# Patient Record
Sex: MALE | Race: WHITE | NOT HISPANIC OR LATINO | URBAN - METROPOLITAN AREA
[De-identification: names, ages, dates, MRNs, and addresses within clinical notes are randomized per-mention and may not be internally consistent; named-entity substitution may affect disease eponyms.]

---

## 2018-11-05 ENCOUNTER — OUTPATIENT (OUTPATIENT)
Dept: OUTPATIENT SERVICES | Facility: HOSPITAL | Age: 83
LOS: 1 days | Discharge: HOME | End: 2018-11-05

## 2018-11-05 DIAGNOSIS — I10 ESSENTIAL (PRIMARY) HYPERTENSION: ICD-10-CM

## 2018-11-05 DIAGNOSIS — R94.31 ABNORMAL ELECTROCARDIOGRAM [ECG] [EKG]: ICD-10-CM

## 2018-11-07 ENCOUNTER — OUTPATIENT (OUTPATIENT)
Dept: OUTPATIENT SERVICES | Facility: HOSPITAL | Age: 83
LOS: 1 days | Discharge: HOME | End: 2018-11-07

## 2018-11-07 DIAGNOSIS — R94.31 ABNORMAL ELECTROCARDIOGRAM [ECG] [EKG]: ICD-10-CM

## 2023-06-20 PROBLEM — Z00.00 ENCOUNTER FOR PREVENTIVE HEALTH EXAMINATION: Status: ACTIVE | Noted: 2023-06-20

## 2023-06-21 ENCOUNTER — APPOINTMENT (OUTPATIENT)
Dept: NEUROLOGY | Facility: CLINIC | Age: 88
End: 2023-06-21
Payer: MEDICARE

## 2023-06-21 DIAGNOSIS — Z85.828 PERSONAL HISTORY OF OTHER MALIGNANT NEOPLASM OF SKIN: ICD-10-CM

## 2023-06-21 DIAGNOSIS — Z86.79 PERSONAL HISTORY OF OTHER DISEASES OF THE CIRCULATORY SYSTEM: ICD-10-CM

## 2023-06-21 DIAGNOSIS — Z86.39 PERSONAL HISTORY OF OTHER ENDOCRINE, NUTRITIONAL AND METABOLIC DISEASE: ICD-10-CM

## 2023-06-21 PROCEDURE — 99204 OFFICE O/P NEW MOD 45 MIN: CPT

## 2023-06-21 RX ORDER — AMLODIPINE BESYLATE 5 MG/1
5 TABLET ORAL
Refills: 0 | Status: ACTIVE | COMMUNITY

## 2023-06-21 RX ORDER — PRAVASTATIN SODIUM 10 MG/1
10 TABLET ORAL
Refills: 0 | Status: ACTIVE | COMMUNITY

## 2023-06-21 NOTE — PHYSICAL EXAM
[FreeTextEntry1] : PHYSICAL EXAMINATION:\par Head: Normocephalic, atraumatic. Negative TA tenderness/prominence. \par \par Neck: Supple with full range of motion; nontender with negative bilateral Spurling's signs. \par \par Spine: Full range of motion; nontender. Negative straight leg raise maneuvers. \par \par Extremities: Non-tender. Atraumatic. Negative Tinel's signs. \par \par NEUROLOGICAL EXAMINATION: \par \par Mental Status: Patient is a good informant with intact orientation, attention, concentration, recent and remote memory. Language evaluation reveals no evidence of aphasia. Fund of knowledge is normal. \par \par Cranial Nerves Cranial Nerves: \par \par II, III, IV, VI: Bilateral lens implants. \par \par V: Normal jaw movements. Normal facial sensation. \par \par VII: Normal facial motor testing. \par \par VIII: Grossly normal hearing bilaterally. \par \par IX, X: Palate moves symmetrically. No dysarthria. \par \par XI: Normal shoulder shrug and sternocleidomastoid power. \par \par XII: Tongue appears normal and protrudes in the midline. \par \par Motor: Adequate strength in the UEs and LEs. \par \par Muscle Stretch Reflexes (right/left): Unobtainable in the LEs, hypoactive in the UEs. \par \par Plantar Responses: Flexor bilaterally. \par \par Coordination: Normal finger to nose and heel to shin testing, no truncal ataxia and no tremor. \par \par Sensation: Normal primary sensation. Normal double simultaneous stimulation. \par \par Gait and Station: Kyphotic posture when he walks. Gait is unsure and shuffling. Unsteady on the Romberg, cannot perform the tandem. \par

## 2023-06-21 NOTE — ASSESSMENT
[FreeTextEntry1] : Impression is that of polyneuropathy and left lumbar radiculopathy. I requested EMGs of the UE and LE to assess the severity of his neuropathy. We will see him back in follow up when testing is complete. We can order a metabolic work up to evaluate for an etiology for his neuropathy when he returns as amlodipine is not listed as a causal factor on the internet. \par \par Total clinician time spent today on the patient is 40 minutes including preparing to see the patient, obtaining and/or reviewing and confirming history, performing medically necessary and appropriate examination, counseling and educating the patient and/or family, documenting clinical information in the EHR and communicating and/or referring to other healthcare professionals.\par \par Entered by Karyna Aguilar acting as scribe for Dr. Sanford.\par \par \par The documentation recorded by the scribe, in my presence, accurately reflects the service I personally performed, and the decisions made by me with my edits as appropriate. \par Saulo Sanford MD, FAAN, FACP\par Diplomate [employer] Board of Psychiatry & Neu[employer]\par \par

## 2023-06-21 NOTE — HISTORY OF PRESENT ILLNESS
[FreeTextEntry1] : Mr. Bah is an 89-year-old male who presents today in neurologic consultation for symptoms of numbness, tingling, and pins and needles in his feet for about 8 years duration. He also notes left sided sciatic pain for about 2 years. Patient saw Dr. Kerr in the past who ordered a lumbar MRI in 2021 which revealed left L2-3 disc herniation and L5-S1 disc herniation as well as severe spinal stenosis at L4-5 with grade 1 spondylolisthesis. Patient has had several injections with Dr. Kerr, but they have not helped his symptoms. He is convinced that amlodipine is the cause of his neuropathy despite the fact that there is no reference that the medication causes neuropathy on the internet.

## 2023-07-18 ENCOUNTER — APPOINTMENT (OUTPATIENT)
Dept: NEUROLOGY | Facility: CLINIC | Age: 88
End: 2023-07-18
Payer: MEDICARE

## 2023-07-18 PROCEDURE — 95913 NRV CNDJ TEST 13/> STUDIES: CPT

## 2023-07-18 PROCEDURE — 95886 MUSC TEST DONE W/N TEST COMP: CPT

## 2023-08-01 ENCOUNTER — APPOINTMENT (OUTPATIENT)
Dept: NEUROLOGY | Facility: CLINIC | Age: 88
End: 2023-08-01
Payer: MEDICARE

## 2023-08-01 VITALS — HEART RATE: 76 BPM | DIASTOLIC BLOOD PRESSURE: 77 MMHG | SYSTOLIC BLOOD PRESSURE: 157 MMHG

## 2023-08-01 PROCEDURE — 99214 OFFICE O/P EST MOD 30 MIN: CPT

## 2023-08-01 NOTE — REVIEW OF SYSTEMS
[Feeling Poorly] : feeling poorly [Leg Weakness] : leg weakness [Poor Coordination] : poor coordination [Numbness] : numbness [Tingling] : tingling [Abnormal Sensation] : an abnormal sensation [Difficulty Walking] : difficulty walking [Ataxia] : ataxia [Negative] : Heme/Lymph

## 2023-08-02 NOTE — HISTORY OF PRESENT ILLNESS
[FreeTextEntry1] : Original Presentation : Mr. Bah is an 89-year-old male who presents today in neurologic consultation for symptoms of numbness, tingling, and pins and needles in his feet for about 8 years duration. He also notes left sided sciatic pain for about 2 years. Patient saw Dr. Kerr in the past who ordered a lumbar MRI in 2021 which revealed left L2-3 disc herniation and L5-S1 disc herniation as well as severe spinal stenosis at L4-5 with grade 1 spondylolisthesis. Patient has had several injections with Dr. Kerr, but they have not helped his symptoms. He is convinced that amlodipine is the cause of his neuropathy despite the fact that there is no reference that the medication causes neuropathy on the internet.   MRI L spine 7.21.23 : T12-L1 disc bulge, L1-L2 posterior disc bulge, L2-3 left posterolateral disc herniation, L3-4 posterior disc bulge, L4-5 stenosis, L5-S1 posterior disc herniation impressing the midline ventral surface of the thecal sac.   Today : Today I had the pleasure of seeing Mr. Bah in our office for follow up. The patients previous history and physical findings have been reviewed.    Mr. Bah remains under our care for polyneuropathy to the hands and feet as well as chronic low back pain with lumbar radiculopathy. Today we reviewed the results of his MRI of the Lumbar spine and EMG's. He states that his symptoms of numbness and tingling are constant and his backpain is often worsened by prolonged sitting. He states his condition has made preforming his daily activities difficult and he has become less active. We discussed treatment options such as physical therapy, medications and evaluation by pain management.

## 2023-08-02 NOTE — ASSESSMENT
[FreeTextEntry1] : 89 year old male with chronic pain to his lumbar spine with radiculopathy as well as polyneuropathy to the hands and feet. Today we reviewed his MRI of the Lumbar spine and EMG's of the lower extremities. We discussed modes of conservative management as well as oral medications to control his symptoms. Mr. Bah is very worried about potential side effects from medications and after a long discussion agreed to trial Cymbalta 40mg once daily. He will return to the office in 3-4 weeks to discuss his progress and make any dosage adjustments if necessary. I also provided him compound cream to trial.   I have personally reviewed with the PA, this patients history and physical exam findings, as documented above. I have discussed the relevant areas of concern, having direct implications to the presenting problems and illnesses, and have personally examined all pertinent findings which impact on the prior neurological treatment.   Teri Catherine MS, PATORSTEN Sanford MD

## 2023-08-02 NOTE — PHYSICAL EXAM
[General Appearance - Alert] : alert [General Appearance - In No Acute Distress] : in no acute distress [Oriented To Time, Place, And Person] : oriented to person, place, and time [Impaired Insight] : insight and judgment were intact [Affect] : the affect was normal [Person] : oriented to person [Place] : oriented to place [Time] : oriented to time [Concentration Intact] : normal concentrating ability [Visual Intact] : visual attention was ~T not ~L decreased [Naming Objects] : no difficulty naming common objects [Repeating Phrases] : no difficulty repeating a phrase [Writing A Sentence] : no difficulty writing a sentence [Fluency] : fluency intact [Comprehension] : comprehension intact [Reading] : reading intact [Past History] : adequate knowledge of personal past history [Cranial Nerves Optic (II)] : visual acuity intact bilaterally,  visual fields full to confrontation, pupils equal round and reactive to light [Cranial Nerves Oculomotor (III)] : extraocular motion intact [Cranial Nerves Trigeminal (V)] : facial sensation intact symmetrically [Cranial Nerves Facial (VII)] : face symmetrical [Cranial Nerves Vestibulocochlear (VIII)] : hearing was intact bilaterally [Cranial Nerves Glossopharyngeal (IX)] : tongue and palate midline [Cranial Nerves Accessory (XI - Cranial And Spinal)] : head turning and shoulder shrug symmetric [Cranial Nerves Hypoglossal (XII)] : there was no tongue deviation with protrusion [Motor Tone] : muscle tone was normal in all four extremities [Motor Strength] : muscle strength was normal in all four extremities [No Muscle Atrophy] : normal bulk in all four extremities [Motor Strength Lower Extremities Bilaterally] : there was weakness in both lower extremities [Sensation Tactile Decrease] : light touch was intact [Abnormal Walk] : normal gait [Balance] : balance was intact [2+] : Ankle jerk left 2+ [PERRL With Normal Accommodation] : pupils were equal in size, round, reactive to light, with normal accommodation [Extraocular Movements] : extraocular movements were intact [Involuntary Movements] : no involuntary movements were seen [Motor Strength Upper Extremities Bilaterally] : strength was normal in both upper extremities [Past-pointing] : there was no past-pointing [Tremor] : no tremor present [Plantar Reflex Right Only] : normal on the right [Plantar Reflex Left Only] : normal on the left [Neck Appearance] : the appearance of the neck was normal [FreeTextEntry1] : LE weakness b/l

## 2023-09-08 ENCOUNTER — APPOINTMENT (OUTPATIENT)
Dept: NEUROLOGY | Facility: CLINIC | Age: 88
End: 2023-09-08
Payer: MEDICARE

## 2023-09-08 VITALS — HEART RATE: 85 BPM | SYSTOLIC BLOOD PRESSURE: 145 MMHG | DIASTOLIC BLOOD PRESSURE: 70 MMHG

## 2023-09-08 DIAGNOSIS — M48.061 SPINAL STENOSIS, LUMBAR REGION WITHOUT NEUROGENIC CLAUDICATION: ICD-10-CM

## 2023-09-08 PROCEDURE — 99214 OFFICE O/P EST MOD 30 MIN: CPT

## 2023-09-08 RX ORDER — DULOXETINE HYDROCHLORIDE 40 MG/1
40 CAPSULE, DELAYED RELEASE PELLETS ORAL
Qty: 30 | Refills: 5 | Status: ACTIVE | COMMUNITY
Start: 2023-08-01 | End: 1900-01-01

## 2023-09-08 NOTE — HISTORY OF PRESENT ILLNESS
[FreeTextEntry1] : Original Presentation : Mr. Bah is a 89-year-old male who presents today in neurologic consultation for symptoms of numbness, tingling, and pins and needles in his feet for about 8 years duration. He also notes left sided sciatic pain for about 2 years. Patient saw Dr. Kerr in the past who ordered a lumbar MRI in 2021 which revealed left L2-3 disc herniation and L5-S1 disc herniation as well as severe spinal stenosis at L4-5 with grade 1 spondylolisthesis. Patient has had several injections with Dr. Kerr, but they have not helped his symptoms. He is convinced that amlodipine is the cause of his neuropathy despite the fact that there is no reference that the medication causes neuropathy on the internet.   MRI L spine 7.21.23 : T12-L1 disc bulge, L1-L2 posterior disc bulge, L2-3 left posterolateral disc herniation, L3-4 posterior disc bulge, L4-5 stenosis, L5-S1 posterior disc herniation impressing the midline ventral surface of the thecal sac.   Today : Today I had the pleasure of seeing Mr. Bah in our office for follow up. The patients previous history and physical findings have been reviewed.    Mr. Bah remains under our care for polyneuropathy to the hands and feet as well as chronic low back pain with lumbar radiculopathy. Today we reviewed the results of his MRI of the Lumbar spine and EMG's. He states that his symptoms of numbness and tingling are constant and his backpain is often worsened by prolonged sitting. At his previous visit he began Cymbalta 40mg 1 tab once daily and today he reports some improvement in the numbness and tingling in his legs as well as improved mood. He denies negative side effects and is overall pleased that he has found a medication that provides him some relief.

## 2023-09-08 NOTE — PHYSICAL EXAM
[General Appearance - Alert] : alert [General Appearance - In No Acute Distress] : in no acute distress [Oriented To Time, Place, And Person] : oriented to person, place, and time [Impaired Insight] : insight and judgment were intact [Affect] : the affect was normal [Person] : oriented to person [Place] : oriented to place [Time] : oriented to time [Concentration Intact] : normal concentrating ability [Visual Intact] : visual attention was ~T not ~L decreased [Naming Objects] : no difficulty naming common objects [Repeating Phrases] : no difficulty repeating a phrase [Writing A Sentence] : no difficulty writing a sentence [Fluency] : fluency intact [Comprehension] : comprehension intact [Reading] : reading intact [Past History] : adequate knowledge of personal past history [Cranial Nerves Optic (II)] : visual acuity intact bilaterally,  visual fields full to confrontation, pupils equal round and reactive to light [Cranial Nerves Oculomotor (III)] : extraocular motion intact [Cranial Nerves Trigeminal (V)] : facial sensation intact symmetrically [Cranial Nerves Facial (VII)] : face symmetrical [Cranial Nerves Vestibulocochlear (VIII)] : hearing was intact bilaterally [Cranial Nerves Glossopharyngeal (IX)] : tongue and palate midline [Cranial Nerves Accessory (XI - Cranial And Spinal)] : head turning and shoulder shrug symmetric [Cranial Nerves Hypoglossal (XII)] : there was no tongue deviation with protrusion [Motor Tone] : muscle tone was normal in all four extremities [Motor Strength] : muscle strength was normal in all four extremities [No Muscle Atrophy] : normal bulk in all four extremities [Motor Strength Lower Extremities Bilaterally] : there was weakness in both lower extremities [Sensation Tactile Decrease] : light touch was intact [Abnormal Walk] : normal gait [Balance] : balance was intact [2+] : Ankle jerk left 2+ [PERRL With Normal Accommodation] : pupils were equal in size, round, reactive to light, with normal accommodation [Extraocular Movements] : extraocular movements were intact [Neck Appearance] : the appearance of the neck was normal [Involuntary Movements] : no involuntary movements were seen [Motor Strength Upper Extremities Bilaterally] : strength was normal in both upper extremities [Past-pointing] : there was no past-pointing [Tremor] : no tremor present [Plantar Reflex Right Only] : normal on the right [Plantar Reflex Left Only] : normal on the left [FreeTextEntry1] : LE weakness b/l

## 2023-10-17 DIAGNOSIS — M54.16 RADICULOPATHY, LUMBAR REGION: ICD-10-CM

## 2023-10-17 DIAGNOSIS — G62.9 POLYNEUROPATHY, UNSPECIFIED: ICD-10-CM

## 2023-10-17 RX ORDER — DULOXETINE HYDROCHLORIDE 40 MG/1
40 CAPSULE, DELAYED RELEASE PELLETS ORAL
Qty: 30 | Refills: 5 | Status: ACTIVE | COMMUNITY
Start: 2023-10-17 | End: 1900-01-01

## 2024-03-08 ENCOUNTER — APPOINTMENT (OUTPATIENT)
Dept: NEUROLOGY | Facility: CLINIC | Age: 89
End: 2024-03-08

## 2025-01-21 ENCOUNTER — RX RENEWAL (OUTPATIENT)
Age: 89
End: 2025-01-21